# Patient Record
Sex: MALE | Race: OTHER | Employment: UNEMPLOYED | URBAN - METROPOLITAN AREA
[De-identification: names, ages, dates, MRNs, and addresses within clinical notes are randomized per-mention and may not be internally consistent; named-entity substitution may affect disease eponyms.]

---

## 2021-12-22 ENCOUNTER — APPOINTMENT (OUTPATIENT)
Dept: ULTRASOUND IMAGING | Age: 21
End: 2021-12-22

## 2021-12-22 ENCOUNTER — APPOINTMENT (OUTPATIENT)
Dept: GENERAL RADIOLOGY | Age: 21
End: 2021-12-22

## 2021-12-22 ENCOUNTER — HOSPITAL ENCOUNTER (EMERGENCY)
Age: 21
Discharge: HOME | End: 2021-12-23
Attending: EMERGENCY MEDICINE

## 2021-12-22 VITALS
HEIGHT: 70 IN | SYSTOLIC BLOOD PRESSURE: 122 MMHG | WEIGHT: 160 LBS | HEART RATE: 56 BPM | DIASTOLIC BLOOD PRESSURE: 66 MMHG | OXYGEN SATURATION: 98 % | RESPIRATION RATE: 18 BRPM | TEMPERATURE: 98.3 F | BODY MASS INDEX: 22.9 KG/M2

## 2021-12-22 DIAGNOSIS — M79.602 LEFT ARM PAIN: Primary | ICD-10-CM

## 2021-12-22 PROCEDURE — 99282 EMERGENCY DEPT VISIT SF MDM: CPT

## 2021-12-22 PROCEDURE — 73090 X-RAY EXAM OF FOREARM: CPT

## 2021-12-22 PROCEDURE — 93005 ELECTROCARDIOGRAM TRACING: CPT

## 2021-12-22 PROCEDURE — 6370000000 HC RX 637 (ALT 250 FOR IP): Performed by: PHYSICIAN ASSISTANT

## 2021-12-22 PROCEDURE — 93971 EXTREMITY STUDY: CPT

## 2021-12-22 RX ORDER — HYDROCODONE BITARTRATE AND ACETAMINOPHEN 5; 325 MG/1; MG/1
1 TABLET ORAL ONCE
Status: COMPLETED | OUTPATIENT
Start: 2021-12-22 | End: 2021-12-22

## 2021-12-22 RX ORDER — TRAMADOL HYDROCHLORIDE 50 MG/1
50 TABLET ORAL ONCE
Status: COMPLETED | OUTPATIENT
Start: 2021-12-22 | End: 2021-12-22

## 2021-12-22 RX ORDER — TRAMADOL HYDROCHLORIDE 50 MG/1
50 TABLET ORAL EVERY 6 HOURS PRN
Qty: 10 TABLET | Refills: 0 | Status: SHIPPED | OUTPATIENT
Start: 2021-12-22 | End: 2021-12-25

## 2021-12-22 RX ADMIN — TRAMADOL HYDROCHLORIDE 50 MG: 50 TABLET, COATED ORAL at 23:36

## 2021-12-22 RX ADMIN — HYDROCODONE BITARTRATE AND ACETAMINOPHEN 1 TABLET: 5; 325 TABLET ORAL at 23:37

## 2021-12-22 ASSESSMENT — PAIN SCALES - GENERAL
PAINLEVEL_OUTOF10: 8
PAINLEVEL_OUTOF10: 8

## 2021-12-23 LAB
ANION GAP SERPL CALCULATED.3IONS-SCNC: 12 MMOL/L (ref 4–16)
BASOPHILS ABSOLUTE: 0 K/CU MM
BASOPHILS RELATIVE PERCENT: 0.3 % (ref 0–1)
BUN BLDV-MCNC: 14 MG/DL (ref 6–23)
CALCIUM SERPL-MCNC: 9.7 MG/DL (ref 8.3–10.6)
CHLORIDE BLD-SCNC: 99 MMOL/L (ref 99–110)
CO2: 23 MMOL/L (ref 21–32)
CREAT SERPL-MCNC: 0.6 MG/DL (ref 0.9–1.3)
DIFFERENTIAL TYPE: ABNORMAL
EOSINOPHILS ABSOLUTE: 0.2 K/CU MM
EOSINOPHILS RELATIVE PERCENT: 2 % (ref 0–3)
GFR AFRICAN AMERICAN: >60 ML/MIN/1.73M2
GFR NON-AFRICAN AMERICAN: >60 ML/MIN/1.73M2
GLUCOSE BLD-MCNC: 90 MG/DL (ref 70–99)
HCT VFR BLD CALC: 48.3 % (ref 42–52)
HEMOGLOBIN: 16 GM/DL (ref 13.5–18)
IMMATURE NEUTROPHIL %: 0.4 % (ref 0–0.43)
LYMPHOCYTES ABSOLUTE: 2.4 K/CU MM
LYMPHOCYTES RELATIVE PERCENT: 25.3 % (ref 24–44)
MCH RBC QN AUTO: 29.9 PG (ref 27–31)
MCHC RBC AUTO-ENTMCNC: 33.1 % (ref 32–36)
MCV RBC AUTO: 90.3 FL (ref 78–100)
MONOCYTES ABSOLUTE: 0.6 K/CU MM
MONOCYTES RELATIVE PERCENT: 5.9 % (ref 0–4)
NUCLEATED RBC %: 0 %
PDW BLD-RTO: 12.3 % (ref 11.7–14.9)
PLATELET # BLD: 323 K/CU MM (ref 140–440)
PMV BLD AUTO: 8.8 FL (ref 7.5–11.1)
POTASSIUM SERPL-SCNC: 4.3 MMOL/L (ref 3.5–5.1)
RBC # BLD: 5.35 M/CU MM (ref 4.6–6.2)
SEGMENTED NEUTROPHILS ABSOLUTE COUNT: 6.4 K/CU MM
SEGMENTED NEUTROPHILS RELATIVE PERCENT: 66.1 % (ref 36–66)
SODIUM BLD-SCNC: 134 MMOL/L (ref 135–145)
TOTAL CK: 67 IU/L (ref 38–174)
TOTAL IMMATURE NEUTOROPHIL: 0.04 K/CU MM
TOTAL NUCLEATED RBC: 0 K/CU MM
WBC # BLD: 9.6 K/CU MM (ref 4–10.5)

## 2021-12-23 PROCEDURE — 82550 ASSAY OF CK (CPK): CPT

## 2021-12-23 PROCEDURE — 2580000003 HC RX 258: Performed by: PHYSICIAN ASSISTANT

## 2021-12-23 PROCEDURE — 80048 BASIC METABOLIC PNL TOTAL CA: CPT

## 2021-12-23 PROCEDURE — 85025 COMPLETE CBC W/AUTO DIFF WBC: CPT

## 2021-12-23 RX ORDER — 0.9 % SODIUM CHLORIDE 0.9 %
1000 INTRAVENOUS SOLUTION INTRAVENOUS ONCE
Status: COMPLETED | OUTPATIENT
Start: 2021-12-23 | End: 2021-12-23

## 2021-12-23 RX ORDER — KETOROLAC TROMETHAMINE 30 MG/ML
30 INJECTION, SOLUTION INTRAMUSCULAR; INTRAVENOUS ONCE
Status: DISCONTINUED | OUTPATIENT
Start: 2021-12-23 | End: 2021-12-23 | Stop reason: HOSPADM

## 2021-12-23 RX ADMIN — SODIUM CHLORIDE 1000 ML: 9 INJECTION, SOLUTION INTRAVENOUS at 02:49

## 2021-12-23 ASSESSMENT — PAIN SCALES - GENERAL: PAINLEVEL_OUTOF10: 4

## 2021-12-23 ASSESSMENT — PAIN DESCRIPTION - PAIN TYPE: TYPE: ACUTE PAIN

## 2021-12-23 NOTE — ED PROVIDER NOTES
Triage Chief Complaint:   No chief complaint on file. [place]:  Today in the ED I had the pleasure of caring for Sherry Mandujano who is a 22 y.o. male that presents today to the emergency department complaining of left-sided hand, forearm pain. Been present times roughly 7 a days. Achy in nature. Made worse with movement. Active or passive. As well as palpation. Denies any associated paresthesias or trauma. No new injuries. No new activities or new jobs to elicit worsening pain. ROS:  REVIEW OF SYSTEMS    At least 10 systems reviewed      All other review of systems are negative  See HPI and nursing notes for additional information       No past medical history on file. No past surgical history on file. No family history on file. Social History     Socioeconomic History    Marital status: Single     Spouse name: Not on file    Number of children: Not on file    Years of education: Not on file    Highest education level: Not on file   Occupational History    Not on file   Tobacco Use    Smoking status: Not on file    Smokeless tobacco: Not on file   Substance and Sexual Activity    Alcohol use: Not on file    Drug use: Not on file    Sexual activity: Not on file   Other Topics Concern    Not on file   Social History Narrative    Not on file     Social Determinants of Health     Financial Resource Strain:     Difficulty of Paying Living Expenses: Not on file   Food Insecurity:     Worried About Running Out of Food in the Last Year: Not on file    Rehana of Food in the Last Year: Not on file   Transportation Needs:     Lack of Transportation (Medical): Not on file    Lack of Transportation (Non-Medical):  Not on file   Physical Activity:     Days of Exercise per Week: Not on file    Minutes of Exercise per Session: Not on file   Stress:     Feeling of Stress : Not on file   Social Connections:     Frequency of Communication with Friends and Family: Not on file    Frequency of Social Oral mucosa is moist no exudate buccal mucosa shows no ulcerations. Uvula is midline    Neck: Neck is supple full range of motion trachea midline thyroid nonpalpable  Cardiac: Heart regular rate rhythm no murmurs rubs clicks or gallops  Lungs: Lungs are clear to auscultation there is no wheezing rhonchi or rales. There is no use of accessory muscles no nasal flaring identified. Chest wall: There is no tenderness to palpation over the chest wall or over ribs  Abdomen: Abdomen is soft nontender nondistended. There is no firm or pulsatile masses no rebound rigidity or guarding negative Harley's negative McBurney, no peritoneal signs  Suprapubic:  there is no tenderness to palpation over the external bladder   Musculoskeletal: Left upper extremity shows no objective swelling. No breaks in skin no crepitus. Compartments soft proximally distally. Radial ulnar pulse 2+.  strength is 2/10 secondary to pain. Range of motion with supination pronation flexion extension of the forearm is present. However there is pain with passive and active range of motion. There is no tenderness palpation over patient's biceps or triceps. However complete brachioradialis tenderness palpation is noted. There are no palpable cords or visible varicosities. There is no bony tenderness palpation. Or obvious deformities. Dermatology: Skin is warm and dry there is no obvious abscesses lacerations or lesions noted  Psych: Mentation is grossly normal cognition is grossly normal. Affect is appropriate  Neuro: Motor intact sensory intact cranial nerves II through XII are intact level of consciousness is normal cerebellar function is normal reflexes are grossly normal. No evidence of incontinence or loss of bowel or bladder no saddle anesthesia noted Lymphatic: There is no submandibular or cervical adenopathy appreciated.         I have reviewed and interpreted all of the currently available lab results from this visit (if applicable):  Results for orders placed or performed during the hospital encounter of 12/22/21   CBC auto diff   Result Value Ref Range    WBC 9.6 4.0 - 10.5 K/CU MM    RBC 5.35 4.6 - 6.2 M/CU MM    Hemoglobin 16.0 13.5 - 18.0 GM/DL    Hematocrit 48.3 42 - 52 %    MCV 90.3 78 - 100 FL    MCH 29.9 27 - 31 PG    MCHC 33.1 32.0 - 36.0 %    RDW 12.3 11.7 - 14.9 %    Platelets 412 010 - 098 K/CU MM    MPV 8.8 7.5 - 11.1 FL    Differential Type AUTOMATED DIFFERENTIAL     Segs Relative 66.1 (H) 36 - 66 %    Lymphocytes % 25.3 24 - 44 %    Monocytes % 5.9 (H) 0 - 4 %    Eosinophils % 2.0 0 - 3 %    Basophils % 0.3 0 - 1 %    Segs Absolute 6.4 K/CU MM    Lymphocytes Absolute 2.4 K/CU MM    Monocytes Absolute 0.6 K/CU MM    Eosinophils Absolute 0.2 K/CU MM    Basophils Absolute 0.0 K/CU MM    Nucleated RBC % 0.0 %    Total Nucleated RBC 0.0 K/CU MM    Total Immature Neutrophil 0.04 K/CU MM    Immature Neutrophil % 0.4 0 - 0.43 %   BMP   Result Value Ref Range    Sodium 134 (L) 135 - 145 MMOL/L    Potassium 4.3 3.5 - 5.1 MMOL/L    Chloride 99 99 - 110 mMol/L    CO2 23 21 - 32 MMOL/L    Anion Gap 12 4 - 16    BUN 14 6 - 23 MG/DL    Glucose 90 70 - 99 MG/DL    Calcium 9.7 8.3 - 10.6 MG/DL   CK   Result Value Ref Range    Total CK 67 38 - 174 IU/L      Radiographs (if obtained):  [] The following radiograph was interpreted by myself in the absence of a radiologist:   [] Radiologist's Report Reviewed:  VL DUP UPPER EXTREMITY VENOUS LEFT   Preliminary Result   No evidence of DVT in the left upper extremity. XR RADIUS ULNA LEFT (2 VIEWS)   Final Result   No acute bony abnormality of the forearm. EKG (if obtained):   Please See Note of attending physician for EKG interpretation. Chart review shows recent radiograph(s):  XR RADIUS ULNA LEFT (2 VIEWS)    Result Date: 12/22/2021  EXAMINATION: TWO XRAY VIEWS OF THE LEFT FOREARM 12/22/2021 10:49 pm COMPARISON: None.  HISTORY: ORDERING SYSTEM PROVIDED HISTORY: pain TECHNOLOGIST PROVIDED HISTORY: Reason for exam:->pain Reason for Exam: pain Additional signs and symptoms: none Relevant Medical/Surgical History: none FINDINGS: Findings of skeletal immaturity, within the range of normal for the patient's age. No evidence of acute fracture or dislocation. Bone mineral density and joint spaces are maintained. No elbow joint effusion. No focal soft tissue abnormality. No acute bony abnormality of the forearm. VL DUP UPPER EXTREMITY VENOUS LEFT    No evidence of DVT in the left upper extremity. MDM:     Interventions given this visit:   Orders Placed This Encounter   Medications    traMADol (ULTRAM) tablet 50 mg    traMADol (ULTRAM) 50 MG tablet     Sig: Take 1 tablet by mouth every 6 hours as needed for Pain for up to 3 days. Dispense:  10 tablet     Refill:  0    HYDROcodone-acetaminophen (NORCO) 5-325 MG per tablet 1 tablet    0.9 % sodium chloride bolus    ketorolac (TORADOL) injection 30 mg     I estimate there is LOW risk for (including but not limited to) OPEN FRACTURE, COMPARTMENT SYNDROME, DEEP VENOUS THROMBOSIS, COMPLETE TENDON RUPTURE, NECROTIZING FASCIITIS, or ACUTE ARTERIAL INJURY, thus I consider the discharge disposition reasonable. Kristy Godwinbruce (or their surrogate) and I have discussed the diagnosis and risks, and we agree with discharging home with close follow-up. We also discussed returning to the Emergency Department immediately if new or worsening symptoms occur. We have discussed the symptoms which are most concerning that necessitate immediate return. I independently managed patient today in the ED. /66   Pulse 56   Temp 98.3 °F (36.8 °C) (Oral)   Resp 18   Ht 5' 10\" (1.778 m)   Wt 160 lb (72.6 kg)   SpO2 98%   BMI 22.96 kg/m²       Clinical Impression:  1.  Left arm pain        Disposition referral (if applicable):  [facility] Emergency Department  [address] [facility] [address] [address]  [phone]    If symptoms worsen or persist    Samira Lopes,   [address]  [phone]    In 2 days      Disposition medications (if applicable):  New Prescriptions    TRAMADOL (ULTRAM) 50 MG TABLET    Take 1 tablet by mouth every 6 hours as needed for Pain for up to 3 days. Comment: Please note this report has been produced using speech recognition software and may contain errors related to that system including errors in grammar, punctuation, and spelling, as well as words and phrases that may be inappropriate. If there are any questions or concerns please feel free to contact the dictating provider for clarification.       Elizabeth Vital, [address], [place]  12/23/21 [id number]

## 2021-12-24 LAB
EKG ATRIAL RATE: 71 BPM
EKG DIAGNOSIS: NORMAL
EKG P AXIS: 66 DEGREES
EKG P-R INTERVAL: 114 MS
EKG Q-T INTERVAL: 368 MS
EKG QRS DURATION: 84 MS
EKG QTC CALCULATION (BAZETT): 399 MS
EKG R AXIS: 108 DEGREES
EKG T AXIS: 11 DEGREES
EKG VENTRICULAR RATE: 71 BPM

## 2021-12-30 ENCOUNTER — HOSPITAL ENCOUNTER (INPATIENT)
Age: 21
LOS: 1 days | Discharge: HOME | DRG: 948 | End: 2021-12-31
Attending: EMERGENCY MEDICINE | Admitting: INTERNAL MEDICINE

## 2021-12-30 ENCOUNTER — APPOINTMENT (OUTPATIENT)
Dept: CT IMAGING | Age: 21
DRG: 948 | End: 2021-12-30

## 2021-12-30 DIAGNOSIS — R41.82 ALTERED MENTAL STATUS, UNSPECIFIED ALTERED MENTAL STATUS TYPE: ICD-10-CM

## 2021-12-30 DIAGNOSIS — F06.1 CATATONIC STATE: Primary | ICD-10-CM

## 2021-12-30 LAB
ACETAMINOPHEN LEVEL: <5 UG/ML (ref 15–30)
ALBUMIN SERPL-MCNC: 3.6 GM/DL (ref 3.4–5)
ALCOHOL SCREEN SERUM: <0.01 %WT/VOL
ALP BLD-CCNC: 97 IU/L (ref 40–129)
ALT SERPL-CCNC: 11 U/L (ref 10–40)
ANION GAP SERPL CALCULATED.3IONS-SCNC: 11 MMOL/L (ref 4–16)
AST SERPL-CCNC: 15 IU/L (ref 15–37)
BASE EXCESS: 4 (ref 0–3.3)
BASOPHILS ABSOLUTE: 0 K/CU MM
BASOPHILS RELATIVE PERCENT: 0.4 % (ref 0–1)
BILIRUB SERPL-MCNC: 1 MG/DL (ref 0–1)
BUN BLDV-MCNC: 11 MG/DL (ref 6–23)
CALCIUM SERPL-MCNC: 8.2 MG/DL (ref 8.3–10.6)
CHLORIDE BLD-SCNC: 103 MMOL/L (ref 99–110)
CO2: 21 MMOL/L (ref 21–32)
CREAT SERPL-MCNC: 0.7 MG/DL (ref 0.9–1.3)
DIFFERENTIAL TYPE: ABNORMAL
DOSE AMOUNT: ABNORMAL
DOSE AMOUNT: ABNORMAL
DOSE TIME: ABNORMAL
DOSE TIME: ABNORMAL
EOSINOPHILS ABSOLUTE: 0.1 K/CU MM
EOSINOPHILS RELATIVE PERCENT: 2.2 % (ref 0–3)
GFR AFRICAN AMERICAN: >60 ML/MIN/1.73M2
GFR NON-AFRICAN AMERICAN: >60 ML/MIN/1.73M2
GLUCOSE BLD-MCNC: 83 MG/DL (ref 70–99)
GLUCOSE BLD-MCNC: 87 MG/DL (ref 70–99)
HCO3 VENOUS: 21.6 MMOL/L (ref 19–25)
HCT VFR BLD CALC: 46.9 % (ref 42–52)
HEMOGLOBIN: 15.7 GM/DL (ref 13.5–18)
IMMATURE NEUTROPHIL %: 0.2 % (ref 0–0.43)
LYMPHOCYTES ABSOLUTE: 1.2 K/CU MM
LYMPHOCYTES RELATIVE PERCENT: 21.8 % (ref 24–44)
MAGNESIUM: 1.8 MG/DL (ref 1.8–2.4)
MCH RBC QN AUTO: 29.9 PG (ref 27–31)
MCHC RBC AUTO-ENTMCNC: 33.5 % (ref 32–36)
MCV RBC AUTO: 89.3 FL (ref 78–100)
MONOCYTES ABSOLUTE: 0.5 K/CU MM
MONOCYTES RELATIVE PERCENT: 9.7 % (ref 0–4)
NUCLEATED RBC %: 0 %
O2 SAT, VEN: 92.7 % (ref 50–70)
PCO2, VEN: 42 MMHG (ref 38–52)
PDW BLD-RTO: 12.2 % (ref 11.7–14.9)
PH VENOUS: 7.32 (ref 7.32–7.42)
PLATELET # BLD: 272 K/CU MM (ref 140–440)
PMV BLD AUTO: 8.7 FL (ref 7.5–11.1)
PO2, VEN: 85 MMHG (ref 28–48)
POTASSIUM SERPL-SCNC: 3.4 MMOL/L (ref 3.5–5.1)
RBC # BLD: 5.25 M/CU MM (ref 4.6–6.2)
SALICYLATE LEVEL: <0.3 MG/DL (ref 15–30)
SEGMENTED NEUTROPHILS ABSOLUTE COUNT: 3.7 K/CU MM
SEGMENTED NEUTROPHILS RELATIVE PERCENT: 65.7 % (ref 36–66)
SODIUM BLD-SCNC: 135 MMOL/L (ref 135–145)
TOTAL CK: 103 IU/L (ref 38–174)
TOTAL IMMATURE NEUTOROPHIL: 0.01 K/CU MM
TOTAL NUCLEATED RBC: 0 K/CU MM
TOTAL PROTEIN: 6.1 GM/DL (ref 6.4–8.2)
TSH HIGH SENSITIVITY: 1.36 UIU/ML (ref 0.27–4.2)
WBC # BLD: 5.6 K/CU MM (ref 4–10.5)

## 2021-12-30 PROCEDURE — 83735 ASSAY OF MAGNESIUM: CPT

## 2021-12-30 PROCEDURE — G0480 DRUG TEST DEF 1-7 CLASSES: HCPCS

## 2021-12-30 PROCEDURE — 84443 ASSAY THYROID STIM HORMONE: CPT

## 2021-12-30 PROCEDURE — 85025 COMPLETE CBC W/AUTO DIFF WBC: CPT

## 2021-12-30 PROCEDURE — 99285 EMERGENCY DEPT VISIT HI MDM: CPT

## 2021-12-30 PROCEDURE — 80053 COMPREHEN METABOLIC PANEL: CPT

## 2021-12-30 PROCEDURE — 82962 GLUCOSE BLOOD TEST: CPT

## 2021-12-30 PROCEDURE — 82805 BLOOD GASES W/O2 SATURATION: CPT

## 2021-12-30 PROCEDURE — 82550 ASSAY OF CK (CPK): CPT

## 2021-12-30 PROCEDURE — 70450 CT HEAD/BRAIN W/O DYE: CPT

## 2021-12-30 NOTE — Clinical Note
Patient Class: Inpatient [101]   REQUIRED: Diagnosis: Acute alteration in mental status [[id number]]   Estimated Length of Stay: Estimated stay of more than 2 midnights   Admitting Provider: Rox Newman [[id number]]   Telemetry/Cardiac Monitoring Required?: Yes

## 2021-12-31 VITALS
BODY MASS INDEX: 24.25 KG/M2 | HEIGHT: 68 IN | OXYGEN SATURATION: 100 % | WEIGHT: 160 LBS | SYSTOLIC BLOOD PRESSURE: 152 MMHG | TEMPERATURE: 99.2 F | HEART RATE: 78 BPM | RESPIRATION RATE: 16 BRPM | DIASTOLIC BLOOD PRESSURE: 71 MMHG

## 2021-12-31 PROBLEM — R41.82 ACUTE ALTERATION IN MENTAL STATUS: Status: ACTIVE | Noted: 2021-12-31

## 2021-12-31 LAB
AMPHETAMINES: NEGATIVE
BARBITURATE SCREEN URINE: NEGATIVE
BENZODIAZEPINE SCREEN, URINE: NEGATIVE
CANNABINOID SCREEN URINE: NEGATIVE
COCAINE METABOLITE: NEGATIVE
OPIATES, URINE: NEGATIVE
OXYCODONE: NEGATIVE
PHENCYCLIDINE, URINE: NEGATIVE

## 2021-12-31 PROCEDURE — 80307 DRUG TEST PRSMV CHEM ANLYZR: CPT

## 2021-12-31 PROCEDURE — 1200000000 HC SEMI PRIVATE

## 2021-12-31 RX ORDER — POLYETHYLENE GLYCOL 3350 17 G/17G
17 POWDER, FOR SOLUTION ORAL DAILY PRN
Status: DISCONTINUED | OUTPATIENT
Start: 2021-12-31 | End: 2021-12-31 | Stop reason: HOSPADM

## 2021-12-31 RX ORDER — ONDANSETRON 4 MG/1
4 TABLET, ORALLY DISINTEGRATING ORAL EVERY 8 HOURS PRN
Status: DISCONTINUED | OUTPATIENT
Start: 2021-12-31 | End: 2021-12-31 | Stop reason: HOSPADM

## 2021-12-31 RX ORDER — SODIUM CHLORIDE 0.9 % (FLUSH) 0.9 %
5-40 SYRINGE (ML) INJECTION PRN
Status: DISCONTINUED | OUTPATIENT
Start: 2021-12-31 | End: 2021-12-31 | Stop reason: HOSPADM

## 2021-12-31 RX ORDER — ACETAMINOPHEN 325 MG/1
650 TABLET ORAL EVERY 6 HOURS PRN
Status: DISCONTINUED | OUTPATIENT
Start: 2021-12-31 | End: 2021-12-31 | Stop reason: HOSPADM

## 2021-12-31 RX ORDER — SODIUM CHLORIDE 0.9 % (FLUSH) 0.9 %
5-40 SYRINGE (ML) INJECTION EVERY 12 HOURS SCHEDULED
Status: DISCONTINUED | OUTPATIENT
Start: 2021-12-31 | End: 2021-12-31 | Stop reason: HOSPADM

## 2021-12-31 RX ORDER — ACETAMINOPHEN 650 MG/1
650 SUPPOSITORY RECTAL EVERY 6 HOURS PRN
Status: DISCONTINUED | OUTPATIENT
Start: 2021-12-31 | End: 2021-12-31 | Stop reason: HOSPADM

## 2021-12-31 RX ORDER — SODIUM CHLORIDE 9 MG/ML
25 INJECTION, SOLUTION INTRAVENOUS PRN
Status: DISCONTINUED | OUTPATIENT
Start: 2021-12-31 | End: 2021-12-31 | Stop reason: HOSPADM

## 2021-12-31 RX ORDER — ONDANSETRON 2 MG/ML
4 INJECTION INTRAMUSCULAR; INTRAVENOUS EVERY 6 HOURS PRN
Status: DISCONTINUED | OUTPATIENT
Start: 2021-12-31 | End: 2021-12-31 | Stop reason: HOSPADM

## 2021-12-31 NOTE — PROGRESS NOTES
I was notified that patient is awake, alert, complaining of abdominal pain. I went in to assess the patient again. Patient is Vietnamese-speaking only. Patient's friend/cousin at bedside help with interpretation. Reported that patient is having periumbilical pain, burning, denied any radiation, denied any aggravating or relieving factors. Patient is sitting in bed, crying, wanting to go home. I discussed regarding evaluation by psychiatrist.  If the decide to leave, recommended that they sign AMA.

## 2021-12-31 NOTE — H&P
HISTORY AND PHYSICAL  (Hospitalist, Internal Medicine)  IDENTIFYING INFORMATION   PATIENT:  Dianna Koch  MRN:  [id number]  ADMIT DATE: 12/30/2021      CHIEF COMPLAINT   Not talking    HISTORY OF PRESENT ILLNESS   Dianna Koch is a 22 y.o. male with no significant past medical history, was brought to ED by EMS after the patient started talking all of a sudden. Patient is currently lying still in bed, with his eyes closed, flickering his eyelids. Patient did not even open his eyes on sternal rub, pressing on his nailbeds. According to patient's cousin/friend at bedside, patient complained of chest pain, and then stopped talking. He denied patient having any complaints prior to this. He reports that patient does not work. Denied patient having any headache, visual disturbance, nausea, vomiting, fever, chills, abdominal pain. Denied patient using any alcohol or illicit drugs. At presentation patient was noted to have /67, HR 65, RR 16, temp 99.2, saturating 100% on room air. Lab work was significant for potassium 3.4, LFTs within normal range, random glucose 87, TSH 1.360, alcohol<0.01, CBC within normal range, Tylenol<5, urine drug screen negative. VBG-pH 7.32, PCO2 42, PO2 of 85, HCO3 21.6. CT head without contrast-no acute abnormality. Patient did not receive any medications. PAST MEDICAL HISTORY PAST SURGICAL HISTORY   None significant  none significant   FAMILY HISTORY SOCIAL HISTORY    Reviewed and noncontributory   no smoking, alcohol, illicit drug use   MEDICATIONS ALLERGIES    None   no known drug allergies. PAST MEDICAL, SURGICAL, FAMILY, and SOCIAL HISTORY         No past medical history on file. No past surgical history on file. No family history on file.   Family Hx of HTN  Family Hx as reviewed above, otherwise non-contributory  Social History     Socioeconomic History  Marital status: Single     Spouse name: Not on file    Number of children: Not on file    Years of education: Not on file    Highest education level: Not on file   Occupational History    Not on file   Tobacco Use    Smoking status: Not on file    Smokeless tobacco: Not on file   Substance and Sexual Activity    Alcohol use: Not on file    Drug use: Not on file    Sexual activity: Not on file   Other Topics Concern    Not on file   Social History Narrative    Not on file     Social Determinants of Health     Financial Resource Strain:     Difficulty of Paying Living Expenses: Not on file   Food Insecurity:     Worried About Running Out of Food in the Last Year: Not on file    Rehana of Food in the Last Year: Not on file   Transportation Needs:     Lack of Transportation (Medical): Not on file    Lack of Transportation (Non-Medical): Not on file   Physical Activity:     Days of Exercise per Week: Not on file    Minutes of Exercise per Session: Not on file   Stress:     Feeling of Stress : Not on file   Social Connections:     Frequency of Communication with Friends and Family: Not on file    Frequency of Social Gatherings with Friends and Family: Not on file    Attends Gnosticist Services: Not on file    Active Member of [address] [place] [address] [employer] or Organizations: Not on file    Attends Club or Organization Meetings: Not on file    Marital Status: Not on file   Intimate Partner Violence:     Fear of Current or Ex-Partner: Not on file    Emotionally Abused: Not on file    Physically Abused: Not on file    Sexually Abused: Not on file   Housing Stability:     Unable to Pay for Housing in the Last Year: Not on file    Number of [place] in the Last Year: Not on file    Unstable Housing in the Last Year: Not on file       MEDICATIONS   Medications Prior to Admission  Not in a hospital admission. Current Medications  No current facility-administered medications for this encounter.      No current outpatient medications on file. Allergies  No Known Allergies    REVIEW OF SYSTEMS   Could not be obtained as patient is currently nonverbal.    PHYSICAL EXAM     Wt Readings from Last 3 Encounters:   12/30/21 160 lb (72.6 kg)   12/22/21 160 lb (72.6 kg)       Blood pressure 135/79, pulse 61, temperature 99.2 °F (37.3 °C), temperature source Oral, resp. rate 16, height 5' 8\" (1.727 m), weight 160 lb (72.6 kg), SpO2 99 %. GEN  -lying in bed with eyes closed, resisting when tried to open his eyelids. EYES   -could not examine  HENT  -MM appeared moist.   RESP  -LS CTA equal bilat, no wheezes, rales or rhonchi. Symmetric chest movement. No respiratory distress noted. C/V  -S1/S2 auscultated. RRR without appreciable M/R/G. No peripheral edema. No reproducible chest wall tenderness. GI  -Abdomen is soft, non-distended, no significant tenderness. No masses or guarding. + BS in all quadrants. Rectal exam deferred.   -No CVA tenderness. Morrissey catheter is not present. MS  -B/L extremities - No gross joint deformities. SKIN  -Normal coloration, warm, dry. NEURO  -not moving any of his extremities. LABS AND IMAGING     Results for Shell Hartmann (MRN [id number]) as of 12/31/2021 05:22   Ref.  Range 12/30/2021 22:31   Sodium Latest Ref Range: 135 - 145 MMOL/L 135   Potassium Latest Ref Range: 3.5 - 5.1 MMOL/L 3.4 (L)   Chloride Latest Ref Range: 99 - 110 mMol/L 103   CO2 Latest Ref Range: 21 - 32 MMOL/L 21   BUN Latest Ref Range: 6 - 23 MG/DL 11   Creatinine Latest Ref Range: 0.9 - 1.3 MG/DL 0.7 (L)   Anion Gap Latest Ref Range: 4 - 16  11   GFR Non- Latest Ref Range: >60 mL/min/1.73m2 >60   GFR African American Latest Ref Range: >60 mL/min/1.73m2 >60   Magnesium Latest Ref Range: 1.8 - 2.4 mg/dl 1.8   Glucose Latest Ref Range: 70 - 99 MG/DL 87   CALCIUM, SERUM, 290257 Latest Ref Range: 8.3 - 10.6 MG/DL 8.2 (L)   Total Protein Latest Ref Range: 6.4 - 8.2 GM/DL 6.1 (L)   Total CK Latest Ref Range: 38 - 174 IU/L 103   Albumin Latest Ref Range: 3.4 - 5.0 GM/DL 3.6   Alk Phos Latest Ref Range: 40 - 129 IU/L 97   ALT Latest Ref Range: 10 - 40 U/L 11   AST Latest Ref Range: 15 - 37 IU/L 15   Bilirubin Latest Ref Range: 0.0 - 1.0 MG/DL 1.0   TSH, High Sensitivity Latest Ref Range: 0.270 - 4.20 uIu/ml 1.360   Alcohol Scrn Latest Ref Range: <0.01 %WT/VOL <0.01   DOSE AMOUNT Unknown DOSE AMT. GIVEN - UNKNOWN   DOSE TIME Unknown DOSE TIME GIVEN - UNKNOWN   Acetaminophen Level Latest Ref Range: 15 - 30 ug/ml    Salicylate Lvl Latest Ref Range: 15 - 30 MG/DL <0.3 (L)   WBC Latest Ref Range: 4.0 - 10.5 K/CU MM 5.6   RBC Latest Ref Range: 4.6 - 6.2 M/CU MM 5.25   Hemoglobin Quant Latest Ref Range: 13.5 - 18.0 GM/DL 15.7   Hematocrit Latest Ref Range: 42 - 52 % 46.9   MCV Latest Ref Range: 78 - 100 FL 89.3   MCH Latest Ref Range: 27 - 31 PG 29.9   MCHC Latest Ref Range: 32.0 - 36.0 % 33.5   MPV Latest Ref Range: 7.5 - 11.1 FL 8.7   RDW Latest Ref Range: 11.7 - 14.9 % 12.2   Platelet Count Latest Ref Range: 140 - 440 K/CU    Lymphocyte % Latest Ref Range: 24 - 44 % 21.8 (L)   Monocytes % Latest Ref Range: 0 - 4 % 9.7 (H)   Eosinophils % Latest Ref Range: 0 - 3 % 2.2   Basophils % Latest Ref Range: 0 - 1 % 0.4   Lymphocytes Absolute Latest Units: K/CU MM 1.2   Monocytes Absolute Latest Units: K/CU MM 0.5   Eosinophils Absolute Latest Units: K/CU MM 0.1   Basophils Absolute Latest Units: K/CU MM 0.0   Differential Type Unknown AUTOMATED DIFFERENTIAL   Segs Relative Latest Ref Range: 36 - 66 % 65.7   Segs Absolute Latest Units: K/CU MM 3.7   Nucleated RBC % Latest Units: % 0.0   Immature Neutrophil % Latest Ref Range: 0 - 0.43 % 0.2   Total Immature Neutrophil Latest Units: K/CU MM 0.01   Total Nucleated RBC Latest Units: K/CU MM 0.0     Results for Cookie House (MRN [id number]) as of 12/31/2021 05:22   Ref.  Range 12/31/2021 03:01   Amphetamines Latest Ref Range: NEGATIVE  NEGATIVE   Cocaine

## 2021-12-31 NOTE — ED PROVIDER NOTES
Triage Chief Complaint:   Altered Mental Status    [place]:  Pawel Hobbs is a 22 y.o. male that presents for changes in mental status. Patient currently nonverbal and brought in by EMS. Family friend is present and states that about 3 hours ago the patient was sitting down and pointed to the restroom and all of a sudden seemed to be unable to talk. States that before this started 3 hours ago, the patient was normal, walking, talking, eating and drinking. No reported recent illness including fever, cough, vomiting or diarrhea. Family friend denies that the patient uses drugs or alcohol. He denies that he has any medical problems. EMS states that the patient has not walked or talked for them and largely does not interact but does have his eyes open. On arrival, patient not contributing to history of present illness. EMS states vital signs been normal.    ROS:  Unable to obtain    No past medical history on file. No past surgical history on file. No family history on file. Social History     Socioeconomic History    Marital status: Single     Spouse name: Not on file    Number of children: Not on file    Years of education: Not on file    Highest education level: Not on file   Occupational History    Not on file   Tobacco Use    Smoking status: Not on file    Smokeless tobacco: Not on file   Substance and Sexual Activity    Alcohol use: Not on file    Drug use: Not on file    Sexual activity: Not on file   Other Topics Concern    Not on file   Social History Narrative    Not on file     Social Determinants of Health     Financial Resource Strain:     Difficulty of Paying Living Expenses: Not on file   Food Insecurity:     Worried About Running Out of Food in the Last Year: Not on file    Rehana of Food in the Last Year: Not on file   Transportation Needs:     Lack of Transportation (Medical): Not on file    Lack of Transportation (Non-Medical):  Not on file   Physical Activity:     Days of Exercise per Week: Not on file    Minutes of Exercise per Session: Not on file   Stress:     Feeling of Stress : Not on file   Social Connections:     Frequency of Communication with Friends and Family: Not on file    Frequency of Social Gatherings with Friends and Family: Not on file    Attends Worship Services: Not on file    Active Member of [address] [place] [address] [address] [employer] Organization or Organizations: Not on file    Attends Club or Organization Meetings: Not on file    Marital Status: Not on file   Intimate Partner Violence:     Fear of Current or Ex-Partner: Not on file    Emotionally Abused: Not on file    Physically Abused: Not on file    Sexually Abused: Not on file   Housing Stability:     Unable to Pay for Housing in the Last Year: Not on file    Number of [place] in the Last Year: Not on file    Unstable Housing in the Last Year: Not on file     No current facility-administered medications for this encounter. No current outpatient medications on file. No Known Allergies    Nursing Notes Reviewed    Physical Exam:  ED Triage Vitals [12/30/21 2204]   Enc Vitals Group      BP (!) 142/67      Pulse 65      Resp 16      Temp 99.2 °F (37.3 °C)      Temp Source Oral      SpO2 100 %      Weight 160 lb (72.6 kg)      Height 5' 8\" (1.727 m)      Head Circumference       Peak Flow       Pain Score       Pain Loc       Pain Edu? Excl. in [address]? GENERAL APPEARANCE: Awake. No acute distress. Appears to be resting comfortably. HEAD: Normocephalic. Atraumatic. EYES: EOM's grossly intact. Sclera anicteric. Pupils equal and reactive. No nystagmus. ENT: Mucous membranes are moist. Tolerates saliva. No trismus. NECK: Supple. No meningismus. Trachea midline. HEART: RRR. Radial pulses 2+. LUNGS: Respirations unlabored. CTAB  ABDOMEN: Soft. Non-tender. No guarding or rebound. EXTREMITIES: No acute deformities. SKIN: Warm and dry. NEUROLOGICAL: No gross facial drooping. Moves all 4 extremities spontaneously. Tracks with eyes. Generalized weakness in all extremities but does move all extremities. No clonus. 2+ patellar reflexes. Hand drop test is positive. Follows simple commands. PSYCHIATRIC: Unable to access    I have reviewed and interpreted all of the currently available lab results from this visit (if applicable):  Results for orders placed or performed during the hospital encounter of 12/30/21   Ethanol Level   Result Value Ref Range    Alcohol Scrn <0.01 <5.95 %WT/VOL   Salicylate Level   Result Value Ref Range    Salicylate Lvl <5.5 (L) 15 - 30 MG/DL    DOSE AMOUNT DOSE AMT. GIVEN - UNKNOWN     DOSE TIME DOSE TIME GIVEN - UNKNOWN    Acetaminophen level   Result Value Ref Range    Acetaminophen Level <5.0 (L) 15 - 30 ug/ml    DOSE AMOUNT DOSE AMT.  GIVEN - UNKNOWN     DOSE TIME DOSE TIME GIVEN - UNKNOWN    CBC Auto Differential   Result Value Ref Range    WBC 5.6 4.0 - 10.5 K/CU MM    RBC 5.25 4.6 - 6.2 M/CU MM    Hemoglobin 15.7 13.5 - 18.0 GM/DL    Hematocrit 46.9 42 - 52 %    MCV 89.3 78 - 100 FL    MCH 29.9 27 - 31 PG    MCHC 33.5 32.0 - 36.0 %    RDW 12.2 11.7 - 14.9 %    Platelets 948 194 - 918 K/CU MM    MPV 8.7 7.5 - 11.1 FL    Differential Type AUTOMATED DIFFERENTIAL     Segs Relative 65.7 36 - 66 %    Lymphocytes % 21.8 (L) 24 - 44 %    Monocytes % 9.7 (H) 0 - 4 %    Eosinophils % 2.2 0 - 3 %    Basophils % 0.4 0 - 1 %    Segs Absolute 3.7 K/CU MM    Lymphocytes Absolute 1.2 K/CU MM    Monocytes Absolute 0.5 K/CU MM    Eosinophils Absolute 0.1 K/CU MM    Basophils Absolute 0.0 K/CU MM    Nucleated RBC % 0.0 %    Total Nucleated RBC 0.0 K/CU MM    Total Immature Neutrophil 0.01 K/CU MM    Immature Neutrophil % 0.2 0 - 0.43 %   Comprehensive Metabolic Panel w/ Reflex to MG   Result Value Ref Range    Sodium 135 135 - 145 MMOL/L    Potassium 3.4 (L) 3.5 - 5.1 MMOL/L    Chloride 103 99 - 110 mMol/L    CO2 21 21 - 32 MMOL/L    BUN 11 6 - 23 MG/DL    CREATININE 0.7 (L) 0.9 - 1.3 MG/DL    Glucose 87 70 - 99 MG/DL    Calcium 8.2 (L) 8.3 - 10.6 MG/DL    Albumin 3.6 3.4 - 5.0 GM/DL    Total Protein 6.1 (L) 6.4 - 8.2 GM/DL    Total Bilirubin 1.0 0.0 - 1.0 MG/DL    ALT 11 10 - 40 U/L    AST 15 15 - 37 IU/L    Alkaline Phosphatase 97 40 - 129 IU/L    GFR Non-African American >60 >60 mL/min/1.73m2    GFR African American >60 >60 mL/min/1.73m2    Anion Gap 11 4 - 16   TSH without Reflex   Result Value Ref Range    TSH, High Sensitivity 1.360 0.270 - 4.20 uIu/ml   CK   Result Value Ref Range    Total  38 - 174 IU/L   Blood Gas, Venous   Result Value Ref Range    pH, [place] 7.32 7.32 - 7.42    pCO2, Bonifacio 42 38 - 52 mmHG    pO2, [place] 85 (H) 28 - 48 mmHG    Base Excess 4 (H) 0 - 3.3    HCO3, Venous 21.6 19 - 25 MMOL/L    O2 Sat, [place] 92.7 (H) 50 - 70 %   Magnesium   Result Value Ref Range    Magnesium 1.8 1.8 - 2.4 mg/dl   POCT Glucose   Result Value Ref Range    POC Glucose 83 70 - 99 MG/DL      Radiographs (if obtained):  [] The following radiograph was interpreted by myself in the absence of a radiologist:  [x] Radiologist's Report Reviewed:    EKG (if obtained): (All EKG's are interpreted by myself in the absence of a cardiologist)    MDM:  Plan of care is discussed thoroughly with the patient and family if present. If performed, all imaging and lab work also discussed with patient. All relevant prior results and chart reviewed if available. Patient presents as above. He is in no acute distress and has normal vital signs. He is nonverbal.  He does follow some simple commands such as squeezing my hands, sticking his tongue out but otherwise is largely not interactive with exam.  No obvious rashes. No recent illnesses per family friend. No other known preceding illness. CT head does not show any evidence of acute abnormality. Metabolic work-up is largely unremarkable. TSH is within normal limits. Ethanol, salicylate, acetaminophen levels are within normal limits as well. He is not hypercarbic.   Drug screen is pending at this time. On reevaluation, the patient's neurologic status is unchanged. My greatest suspicion is for underlying psychiatric disorder and catatonic-like state. However, symptoms only started within the last few hours and he has no history of psychiatric disorder. Emergent psychiatric exam in the emergency department at this time would be incomplete and I feel that he needs further observation to rule out any other organic causes of current presentation. He is admitted to the hospitalist at this time. Clinical Impression:  1. Catatonic state    2.  Altered mental status, unspecified altered mental status type      (Please note that portions of this note may have been completed with a voice recognition program. Efforts were made to edit the dictations but occasionally words are mis-transcribed.)    MD Germania Linn MD  12/31/21 0678

## 2022-01-18 NOTE — DISCHARGE SUMMARY
[facility] 2001 [id number]  PCP:  No primary care provider on file. Admit date: 12/30/2021  Admitting Physician: Woody Marquez MD       Discharge date: 1/17/2022 Discharge Physician: Woody Marquez MD      Reason for admission:   Chief Complaint   Patient presents with    Altered Mental Status     Present on Admission:   Acute alteration in mental status       Discharge Diagnoses  1. Altered mental status- resolved  2.  suspected underlying psychological disorder  3. Mild hypokalemia    Hospital Course:  22 y.o. male with no significant past medical history, was brought to ED by EMS after the patient started talking all of a sudden. 4hrs later, I received a call from RN stating that the patient was awake, c/o abdominal pain. I went and assessed the patient again. He was sitting in bed, crying and wanting to go home. Patient was Maced[place] speaking only. Patient's friend/relative at bed side helped with interpretation. Reported that patient is having periumbilical pain, burning, denied any radiation, denied any aggravating or relieving factors. I discussed regarding evaluation by psychiatrist and further evaluation of his abdominal pain if it is persisting. I told them that if they decided to leave, they have to sign AMA, but would be wise to stay and get evaluated. If not the patient might have similar symptoms and end up in ER again. I was not notified about patient leaving AMA. Exam:   Wt Readings from Last 3 Encounters:   12/30/21 160 lb (72.6 kg)   12/22/21 160 lb (72.6 kg)       Blood pressure (!) 152/71, pulse 78, temperature 99.2 °F (37.3 °C), temperature source Oral, resp. rate 16, height 5' 8\" (1.727 m), weight 160 lb (72.6 kg), SpO2 100 %. Significant Diagnostic Studies:   CBC: No results for input(s): WBC, HGB, PLT in the last 72 hours.   WBC   Date/Time Value Ref Range Status   12/30/2021 10:31 PM 5.6 4.0 - 10.5 K/CU MM Final   12/23/2021 12:20 AM 9.6 4.0 - 10.5 K/CU MM Final     Hemoglobin   Date/Time Value Ref Range Status   12/30/2021 10:31 PM 15.7 13.5 - 18.0 GM/DL Final   12/23/2021 12:20 AM 16.0 13.5 - 18.0 GM/DL Final     Platelets   Date/Time Value Ref Range Status   12/30/2021 10:31  140 - 440 K/CU MM Final   12/23/2021 12:20  140 - 440 K/CU MM Final        Patient Instructions:     Medication List      You have not been prescribed any medications. Code Status: Prior    Disposition: AMA         Discharge Physician Signed:    Phan Coleman MD  Hospitalist, Internal medicine  1/17/2022 at 8:14 PM      Please forward this discharge summary to patient's PCP